# Patient Record
Sex: FEMALE | Race: WHITE
[De-identification: names, ages, dates, MRNs, and addresses within clinical notes are randomized per-mention and may not be internally consistent; named-entity substitution may affect disease eponyms.]

---

## 2022-05-13 ENCOUNTER — HOSPITAL ENCOUNTER (EMERGENCY)
Dept: HOSPITAL 61 - ER | Age: 32
Discharge: LEFT BEFORE DISCHARGE | End: 2022-05-13
Payer: MEDICARE

## 2022-05-13 ENCOUNTER — HOSPITAL ENCOUNTER (OUTPATIENT)
Dept: HOSPITAL 63 - US | Age: 32
End: 2022-05-13
Payer: MEDICAID

## 2022-05-13 VITALS — DIASTOLIC BLOOD PRESSURE: 87 MMHG | SYSTOLIC BLOOD PRESSURE: 162 MMHG

## 2022-05-13 VITALS — HEIGHT: 62 IN | BODY MASS INDEX: 24.34 KG/M2 | WEIGHT: 132.28 LBS

## 2022-05-13 DIAGNOSIS — Z34.91: Primary | ICD-10-CM

## 2022-05-13 DIAGNOSIS — Z90.49: ICD-10-CM

## 2022-05-13 DIAGNOSIS — O03.9: Primary | ICD-10-CM

## 2022-05-13 DIAGNOSIS — F43.10: ICD-10-CM

## 2022-05-13 PROCEDURE — 76801 OB US < 14 WKS SINGLE FETUS: CPT

## 2022-05-13 PROCEDURE — 99283 EMERGENCY DEPT VISIT LOW MDM: CPT

## 2022-05-13 NOTE — PHYS DOC
Past Medical History


Past Medical History:  Anxiety


Additional Past Medical Histor:  "OCD, panic disorder, PTSD"


Past Surgical History:  Cholecystectomy, Other


Additional Past Surgical Histo:  tubes in ears, wisdom teeth


Smoking Status:  Unknown if ever smoked


Alcohol Use:  Occasionally


Drug Use:  Marijuana





General Adult


EDM:


Chief Complaint:  VAGINAL BLEEDING PREGNANCY





HPI:


HPI:





Patient is a 31 year old female who presents in police custody for legal ethanol

level.  Patient has complaint of abdominal cramping secondary to known 

miscarriage.  Patient states that she was supposed to go to [place] today for a

D&C.  She does not have an OB at this time.  She denies vaginal bleeding.  

Patient has no other complaints at this time





Review of Systems:


Review of Systems:


ROS negative or noncontributory except as mentioned in HPI.





Heart Score:


C/O Chest Pain:  No





Physical Exam:


PE:





Constitutional: Well developed, well nourished.


HENT: Normocephalic, atraumatic, bilateral external ears normal, nose normal. 


Eyes: EOMI, conjunctiva normal, no discharge. 


Neck: Normal range of motion, no stridor.  


Skin: Warm, dry, no erythema, no rash. 


Neurologic: Alert and oriented x4, normal motor function, normal sensory 

function, no focal deficits noted. 





Thorough exam unable to be performed secondary to patient elopement.





Current Patient Data:


Vital Signs:





                                   Vital Signs








  Date Time  Temp Pulse Resp B/P (MAP) Pulse Ox O2 Delivery O2 Flow Rate FiO2


 


5/13/22 20:15 98.2 84 20 162/87 (112) 100 Room Air  





 98.2       











Radiology/Procedures:


Radiology/Procedures:


Ultrasound report from [facility] interpreted as fetal demise of single 

intrauterine pregnancy at approximately 9 weeks 1 day gestational age.





Course & Med Decision Making:


Course & Med Decision Making


Pertinent Labs and Imaging studies reviewed. (See chart for details)





Patient eloped.





Dragon Disclaimer:


Dragon Disclaimer:


This electronic medical record was generated, in whole or in part, using a voice

recognition dictation system.





Departure


Departure


Impression:  


   Primary Impression:  


   Eloped from emergency department


   Additional Impression:  


   Miscarriage


Disposition:  07 LEFT AWOL/ELOPED


Referrals:  


WATSON ECHEVERRIA DO (PCP)








PACNHO OGLESBY MD


Patient Instructions:  Miscarriage, Easy-to-Read











TOBI DORSEY              May 13, 2022 22:06

## 2022-05-13 NOTE — RAD
EXAM: Obstetrics sonogram.



HISTORY: Unsure dates.



TECHNIQUE: Transabdominal sonographic imaging of the pelvis was performed.



COMPARISON: None.



FINDINGS: The uterus measures 11 x 8 x 8 cm. The cervix measures 4.3 cm in length. There is a single 
intrauterine gestational sac and fetal pole. The mean sac diameter is 3.9 cm, corresponding with a ge
stational age of 9 weeks and 2 days. The fetal crown-rump length is 2.3 cm, corresponding with a gest
ational age of 9 weeks and 0 days. The estimated gestational age based on combined ultrasound measure
ments is 9 weeks and 1 day. No fetal cardiac activity is seen. The ovaries are normal in size and dem
onstrate normal blood flow. There is a 1.5 cm right ovarian follicle. There is no pelvic free fluid.



IMPRESSION: Single intrauterine fetus with a gestational age of 9 weeks and 1 day and no cardiac acti
vity. This is consistent with intrauterine fetal demise/missed miscarriage.



Electronically signed by: Jessika Quintana MD (5/13/2022 11:41 AM) [id number]